# Patient Record
Sex: MALE | Race: WHITE
[De-identification: names, ages, dates, MRNs, and addresses within clinical notes are randomized per-mention and may not be internally consistent; named-entity substitution may affect disease eponyms.]

---

## 2019-10-17 NOTE — RAD
Chest AP portable at 0521:

 

Reason for examination: Altered mental status. Seizure. History of 

traumatic brain injury.

 

The heart size is normal. Mediastinum is unremarkable. Lung fields are 

clear. No acute bony abnormalities are seen.

 

Impression:

 

No acute cardiopulmonary disease.

 

 

CT head without contrast:

 

Axial images were obtained through the brain. No contrast was 

administered.

 

Exposure: One or more of the following individualized dose reduction 

techniques were utilized for this examination:  1. Automated exposure 

control  2. Adjustment of the mA and/or kV according to patient size  3. 

Use of iterative reconstruction technique.

 

Ventricular systems are symmetric and not abnormally dilated. No midline 

shift is seen. There is no evidence of intracranial hemorrhage. There is 

encephalomalacia in the left posterior temporal region and left anterior 

lateral parietal region. No acute infarct, mass or edema seen. No 

abnormalities of seen at the orbits. The paranasal sinuses and mastoid air

cells are clear. There is evidence of previous left craniotomy. No acute 

skull abnormality is seen.

 

IMPRESSION:

 

Encephalomalacia in the left temporal parietal lobes. No acute 

intracranial abnormality evident.

 

Electronically signed by: Mayi Vásquez MD (10/17/2019 6:15 AM) [facility]

## 2019-10-17 NOTE — PHYS DOC
Past History


Past Medical History:  Alcoholism, Seizure


Additional Past Medical Histor:  TBI


 (HARLEEN MCCLENDON DO)


Alcohol Use:  Occasionally


Drug Use:  None


 (HARLEEN MCCLENDON DO)





Adult General


Chief Complaint


Chief Complaint:  SEIZURE





HPI


HPI


39-year-old male presents via EMS for seizure.  The patient was reported to woke

up his significant other around 3:30 AM with tonic-clonic seizure. It is 

believed that this lasted in excess of 10 minutes until after EMS arrived and 

gave Versed.  The patient was unable to provide a history to EMS or to myself. 

This history of present illness is gleaned from EMS reports. When EMS arrived 

the patient was having rhythmic jerking of his upper and lower extremities as 

well as twitching his head. He had no loss of bowel or bladder. After he was 

given Versed, the activity appeared to stop. The patient is now very sleepy and 

is not answering questions. His significant other stated that he is short 

episodes, but nothing as long as this. Patient may or may not be on any seizure 

medication. Possible history of traumatic brain injury in the past.


 (HENRRY DALE DO)





Review of Systems


Review of Systems


Unable to assess due to the patient not answering questions.


 (HENRRY DALE DO)





Current Medications


Current Medications





Current Medications








 Medications


  (Trade)  Dose


 Ordered  Sig/Isai  Start Time


 Stop Time Status Last Admin


Dose Admin


 


 Sodium Chloride  1,000 ml @ 


 1,000 mls/hr  1X  ONCE  10/17/19 04:30


 10/17/19 05:29 UNV  











 (HENRRY DALE DO)





Physical Exam


Physical Exam





Constitutional: Well developed, well nourished, non-toxic appearance. []


HENT: Normocephalic, atraumatic, bilateral external ears normal, oropharynx 

moist, no oral exudates, nose normal. []


Eyes: PERRLA, EOMI, conjunctiva normal, no discharge. [] 


Neck: Normal range of motion, no tenderness, supple, no stridor. [] 


Cardiovascular:Heart rate regular rhythm, no murmur []


Lungs & Thorax:  Bilateral breath sounds clear to auscultation []


Abdomen: Bowel sounds normal, soft, no masses, no pulsatile masses. [] 


Skin: Warm, dry, no erythema, no rash. [] 


Back: No tenderness, no CVA tenderness. [] 


Extremities: No tenderness, no cyanosis, no clubbing, ROM intact, no edema. [] 


Neurologic: Making humming sound, moving all extremities spontaneously, no focal

 deficits noted. []


Psychologic: Unable to assess. []


 (HENRRY DALE DO)


Physical Exam


Constitutional: Well developed, well nourished, no acute distress, non-toxic 

appearance


HENT: Normocephalic, atraumatic, oropharynx moist


Eyes: PERRL, EOMI, conjunctiva normal, no discharge, no nystagmus


Neck: Normal range of motion, no tenderness, supple


Cardiovascular: Heart rate normal, regular rhythm


Lungs & Thorax:  Bilateral breath sounds clear to auscultation, no wheezing


Skin: Warm, dry, no erythema, no rash


Extremities: No tenderness, ROM intact, no edema


Neurologic: Alert and oriented X 3, expressive aphasia noted with some 

dysarthria, shakiness noted to BUE and BLE, moves all extremities, sensation 

intact


Psychologic: Affect normal, judgement normal


 (HARLEEN MCCLENDON DO)





EKG


EKG


Sinus tachycardia, rate 117, normal axis, no ST elevations or depressions.[]


 (HENRRY DALE DO)





Radiology/Procedures


Radiology/Procedures


[]


 (HENRRY DALE DO)


Radiology/Procedures


PROCEDURE: CT HEAD WO CONTRAST & CXR AP





Chest AP portable at 0521:


 


Reason for examination: Altered mental status. Seizure. History of 


traumatic brain injury.


 


The heart size is normal. Mediastinum is unremarkable. Lung fields are 


clear. No acute bony abnormalities are seen.


 


Impression:


 


No acute cardiopulmonary disease.


 


 


CT head without contrast:


 


Axial images were obtained through the brain. No contrast was 


administered.


 


Exposure: One or more of the following individualized dose reduction 


techniques were utilized for this examination:  1. Automated exposure 


control  2. Adjustment of the mA and/or kV according to patient size  3. 


Use of iterative reconstruction technique.


 


Ventricular systems are symmetric and not abnormally dilated. No midline 


shift is seen. There is no evidence of intracranial hemorrhage. There is 


encephalomalacia in the left posterior temporal region and left anterior 


lateral parietal region. No acute infarct, mass or edema seen. No 


abnormalities of seen at the orbits. The paranasal sinuses and mastoid air


cells are clear. There is evidence of previous left craniotomy. No acute 


skull abnormality is seen.


 


IMPRESSION:


 


Encephalomalacia in the left temporal parietal lobes. No acute 


intracranial abnormality evident.


 


Electronically signed by: Mayi Vásquez MD (10/17/2019 6:15 AM) [facility]


 (HARLEEN MCCLENDON DO)


Course & Med Decision Making


Course & Med Decision Making


Pertinent Labs and Imaging studies reviewed. (See chart for details)


The patient's wife has arrived and provides further history. She was awoken at 

3:30 AM with the patient having full body jerking movements. He was unable to 

communicate with her. This lasted 10 minutes or more. The wife has never seen 

the patient have a seizure like this. She believes the last one he had was 5 

years ago. 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 He 

is on Keppra and has been taking his medications. His physicians have been 

adjusting his sleep aid, anxiety medicine, and antidepressants lately. No 

changes in the last 1 week. The patient was of congestion and body aches the 

last couple of days. Patient has a history of alcohol abuse. He was in inpatient

 rehabilitation recently. His last drink was about 4 days ago. Patient has no 

history of alcohol withdrawal difficulty.





The patient's labs are unremarkable. His lactic acid is 2.5. We have given the 

patient 1 L normal saline. Urinalysis is pending. The patient has a bit more 

awake and able to follow directions. His chest x-ray is unremarkable. His head 

CT is negative for acute findings. There are significant chronic findings. See 

official report for more details. I am signing the patient out to Dr. Mcclendon at 

0600. He will determine the patient's final disposition.


[]


 (HENRRY DALE DO)


Course & Med Decision Making


0600- Sign out received from Dr. Dale for patient with history of breakthrough

 seizure.  IVF x 2L.  CT head and CXR reviewed without acute process.  Labs 

reviewed.  Lactic acid elevated likely secondary to tonic-clonic seizure.  UDS 

positive for benzodiazepines.  Hx that patient was given versed by EMS.





Patient seen and evaluated by myself. Patient continues with dysarthria and 

expressive aphasia.  Patient likely post-ictal.   Would benefit from continued 

monitoring and observation.





Patient requiring admission for further evaluation and treatment. Discussed with

 Dr. Cao (hospitalist) who is in agreement with admission. Discussed findings 

and plan with patient, who acknowledges understanding and agreement.


 (HARLEEN MCCLENDON DO)


Dragon Disclaimer


Dragon Disclaimer


This electronic medical record was generated, in whole or in part, using a voice

 recognition dictation system.


 (HENRRY DALE DO)





Departure


Departure:


Impression:  


   Primary Impression:  


   Breakthrough seizure


   Additional Impressions:  


   Lactic acidosis


   Post-ictal state


Disposition:  09 ADMITTED AS INPATIENT


Admitting Physician:  Elio Cao


 (HARLEEN MCCLENDON DO)


Condition:  STABLE





Problem Qualifiers











HENRRY DALE DO                 Oct 17, 2019 04:37


HARLEEN MCCLENDON DO             Oct 17, 2019 06:36

## 2019-10-17 NOTE — HP
ADMIT DATE:  10/17/2019



HISTORY OF PRESENT ILLNESS:  The patient is a 39-year-old  male patient

who was brought to the Emergency Room by EMS for seizure.  The patient reported

to woke up his significant other around 3:30 p.m. with tonic-clonic seizure.  It

is believed that this lasted in excess of 10 minutes until after EMS arrived and

gave Versed.  The patient was unable to provide any history to EMS or to the ER

physician.  This history of present illness was gleaned from EMS report.  When

EMS arrived, the patient was having rhythmic jerking of his upper and lower

extremities as well as twitching.  There was no reported tongue biting or loss

of bowel or bladder.  He was given Versed and activity appeared to stopped.  The

patient is very sleepy.  By the time he arrived, he was in postictal state.  His

significant other stated that most of the other episode are short but nothing as

long as this.  It transpired that the patient is on actually Keppra 1500 mg

twice a day and he has been according to him taking his medication regularly. 

However, his Xanax was stopped by his psychiatrist at [facility] and substituted with trazodone and the patient took Seroquel that was

given to him a long time ago by another doctor.  In any case, he was extensively

investigated in the Emergency Room.  His white cell count was low.  If anything,

he has mild thrombocytopenia.  His lactic acid was slightly high at 2.5. 

Initially, it came down to 0.7 and other than that most other lab works are

unremarkable.  His urinalysis was essentially unremarkable and toxic screen was

positive for any benzodiazepine.  



PAST MEDICAL HISTORY:   Significant for traumatic brain injury about 10 years

ago.  He apparently developed posttraumatic seizure disorder and alcoholism for

which he was admitted for detoxification to [facility].  



PAST SURGICAL HISTORY:  Significant for traumatic brain injury for which he

underwent craniotomy and skull flap placement, although I do not have any

specific details.  Apparently, this happened 10 years ago while he was at

[facility].



PAST PSYCHIATRIC HISTORY:  The patient reported 1 passed detoxification in 2014.

 It was primarily for opiates.  He denied advanced rehabilitation.  He denied

any suicidal attempts before.  He denied any past psychiatric hospitalization

not related to drug use and he reported that he is only prescribed Xanax and

Suboxone as psychiatric medication.



FAMILY HISTORY:  Unremarkable.



SOCIAL HISTORY:  He lives with his significant other and 2-year son.  He

apparently was a .  Unfortunately, he lost his job.  He smokes 5-10

cigarettes a day.  He drinks vodka on a daily basis, last drink according to him

was 3 weeks ago.



ALLERGIES:  He has no known drug allergies.



MEDICATIONS:  He is currently on following medications:  He is on

diphenhydramine (Benadryl) 25 mg.  He is on Keppra 1500 mg p.o. b.i.d., Tylenol

650 mg every 4 hours.  His other medications:  He is on hydroxyzine up to 50 mg

every 6 hours, trazodone 200 mg at bedtime.  He is on Prozac 20 mg once a day

for anxiety.  He is on gabapentin 600 mg 3 times a day.  He is also on Suboxone

8 mg/2 mg daily during hospital stay.



REVIEW OF SYSTEMS:  As per history of present illness.



PHYSICAL EXAMINATION:

GENERAL:  On arrival to the Emergency Room, he was very lethargic.  He

apparently was able to move all extremities spontaneously.

VITAL SIGNS:  Heart rate was 110, blood pressure was 122/69, temperature was

97.2, respiratory rate was 18 and oxygen saturation was 97% on room air.

HEAD, EYES, EARS, NOSE AND THROAT:  Showed normocephalic, atraumatic.

NECK:  Supple.

HEART:  Showed normal first and second heart sounds.  No gallop, rub or murmur.

CHEST:  Clear to auscultation.  No crepitation or rhonchi.

ABDOMEN:  Distended, soft, nontender.

NEUROLOGIC:  He was in a postictal state; however, he was able to move all

extremities spontaneously.



LABORATORY DATA:  While in the Emergency Room, he has had a CT scan of the head,

which showed that the patient's ventricular systems are symmetric and not

abnormally dilated.  No midline shift is seen.  There is no evidence of

intracranial hemorrhage.  There is encephalomalacia in the left posterior

temporal region and left anterolateral parietal region.  No acute infarct, mass

or edema seen.  No abnormalities seen at the orbits.  His paranasal sinuses and

mastoid air cells are clear.  There is evidence of previous left craniotomy.  No

acute skull abnormality seen.  His chest x-ray showed that the patient's heart

size is normal, mediastinum is unremarkable.  Lung fields are clear.  No acute

bony abnormalities are seen.  His lab work showed that his white cell count was

3900, hemoglobin 15, hematocrit 44, MCV 92, and platelet count of 133,000 with

normal manual differential.  His chemistry showed a serum sodium 143, potassium

3.6, chloride 103, bicarbonate 30, anion gap of 10, BUN 16, creatinine 1.1,

estimated GFR was 75 mL per minute.  His glucose was 98, lactic acid was 2.5,

calcium was 8.9.  Total bilirubin, AST, ALT, alkaline phosphatase were normal. 

Total protein was 6.5, albumin was 3.7.  Urinalysis showed the urine was yellow,

clear with a pH of 6, specific gravity of 1.020.  The urine was negative for

protein, glucose, trace amount of ketones.  The urine was negative for blood,

nitrite, leukocyte esterase.  There are no rbc's, no wbc's, and no bacteria. 

His toxic screen was negative for opiates, methadone, barbiturate,

phencyclidine, amphetamine, methamphetamine, cocaine, cannabinoids and alcohol. 

It was positive for benzodiazepine.



ASSESSMENT AND PLAN:  In summary, this is a 39-year-old  male patient

that came with what seemed to be breakthrough seizures.  He also was in

postictal state and had lactic acidosis that has resolved.  He has received

about 3 liters of fluid in the Emergency Room, was given a loading dose of

Keppra and 2 mg of Ativan and was admitted to continue with alcohol withdrawal

protocol and also Keppra 1500 mg twice a day.  I will consult Dr. Holliday to see

him.  We will check his creatine kinase and by the time I saw him, he has not

had any urine for almost 5 hours.  We will scan his bladder and also check his

orthostatics as he continues to feel dizzy.





______________________________

DEEPTI RENNER MD



DR:  TARAH/malia  JOB#:  [id number] / [id number]

DD:  10/17/2019 17:01  DT:  10/17/2019 17:28

## 2019-10-17 NOTE — RAD
Chest AP portable at 0521:

 

Reason for examination: Altered mental status. Seizure. History of 

traumatic brain injury.

 

The heart size is normal. Mediastinum is unremarkable. Lung fields are 

clear. No acute bony abnormalities are seen.

 

Impression:

 

No acute cardiopulmonary disease.

 

 

CT head without contrast:

 

Axial images were obtained through the brain. No contrast was 

administered.

 

Exposure: One or more of the following individualized dose reduction 

techniques were utilized for this examination:  1. Automated exposure 

control  2. Adjustment of the mA and/or kV according to patient size  3. 

Use of iterative reconstruction technique.

 

Ventricular systems are symmetric and not abnormally dilated. No midline 

shift is seen. There is no evidence of intracranial hemorrhage. There is 

encephalomalacia in the left posterior temporal region and left anterior 

lateral parietal region. No acute infarct, mass or edema seen. No 

abnormalities of seen at the orbits. The paranasal sinuses and mastoid air

cells are clear. There is evidence of previous left craniotomy. No acute 

skull abnormality is seen.

 

IMPRESSION:

 

Encephalomalacia in the left temporal parietal lobes. No acute 

intracranial abnormality evident.

 

Electronically signed by: Mayi Vásquez MD (10/17/2019 6:15 AM) [facility]

## 2019-10-18 NOTE — CONS
DATE OF CONSULTATION:  10/17/2019



REFERRING PHYSICIAN:  Dr. Cao.



REASON FOR CONSULTATION:  Possible breakthrough seizure.



HISTORY OF PRESENT ILLNESS:  This is a 39-year-old right-handed  male

who was admitted to Emergency Room after he presented with acute onset of

generalized tonic-clonic seizure began at 3:30 this morning.  The patient did

not recall the event.  He did not have any tongue biting, bowel or bladder

incontinence.  The seizure lasted approximately 10 minutes followed by postictal

confusion and disorientation.  EMS was activated and witnessed generalized

tonic-clonic seizure activities.  He was given Versed and transferred to

Emergency Room and in Emergency Room, he was found to be in postictal state. 

According to the ER note, the patient has been on Xanax, which was stopped by

his psychiatrist and substituted with trazodone.  Lactic acid was elevated at

2.5 and initial head CT scan revealed no evidence of acute intracranial process.

 Urine drug screen was unremarkable.



PAST MEDICAL HISTORY:  Significant for frequent traumatic brain injuries

probably as the etiology of his seizure; however, he was alcoholic.  History of

depression.



PAST SURGICAL HISTORY:  Significant for severe traumatic brain injury requiring

craniotomy approximately 10 years ago.



SOCIAL HISTORY:  The patient is single.  He has a 2-year-old son.  He was

working as a , but he is unemployed at this time.  He smokes half pack of

cigarettes daily and he drinks alcohol, but the last alcohol drink was 3 weeks

ago.



FAMILY HISTORY:  Noncontributory.



CURRENT MEDICATIONS:  Keppra 1500 mg b.i.d., hydroxyzine 50 mg q. 6 hours

p.r.n., trazodone 200 mg at bedtime, Prozac 20 mg daily, gabapentin 600 mg 3

times daily, Suboxone 8 mg/2 mg daily.



REVIEW OF SYSTEMS:  A 10-point review of system was performed as mentioned in

the history of present illness, otherwise, unremarkable.



PHYSICAL EXAMINATION:

GENERAL:  Well-developed, well-nourished male, not in acute distress.  He weighs

74.1 kilos.

VITAL SIGNS:  Blood pressure 119/68, respiratory rate 18, pulse is 89 and

regular, oxygen saturation is 100% on room air.

HEENT:  Normocephalic, atraumatic, otherwise, unremarkable.

NECK:  Supple.  Negative for carotid bruit, lymphadenopathy, JVD or thyromegaly.

LUNGS:  Clear to A and P.

CARDIOVASCULAR:  Regular rate and rhythm, normal S1, S2.

ABDOMEN:  Soft.  Bowel sounds positive.

EXTREMITIES:  Negative for cyanosis, clubbing or pitting edema.

NEUROLOGICAL EXAM:  Mental Status:  The patient is alert and oriented x 2.  The

speech is slow.  The patient has difficulty to complete sentences and provide

more information about his seizure.  Memory, judgment, and abstract thinking are

fair.  The patient denies hallucination or delusion.

CRANIAL NERVES:  Visual fields are full.  The pupils are reactive to light and

accommodation.  The extraocular movements are intact.  There is no nystagmus. 

There is no facial motor or sensory deficit.  Hearing is intact bilaterally. 

The palate is elevated symmetrically.  Sternocleidomastoid muscles are powerful

bilaterally.  The patient shrugs his shoulders symmetrically, protrudes his

tongue in the midline without fasciculation or atrophy.

MOTOR:  No focal muscle bulk was seen.  The tone is normal.  The strength is 4/5

throughout.  Sensory examination revealed normal pinprick, light touch,

vibratory and position senses.  Deep tendon reflexes were hypoactive with absent

Achilles responses.  Gait not tested at this time.



LABORATORY DATA:  CBC revealed white blood cells of 10.6, hemoglobin 14.3,

hematocrit 42, platelet count 124,000.  Chemistry revealed sodium of 141,

potassium 3.8, chloride 107, CO2 of 26, BUN 16, creatinine 0.9, glucose 115,

calcium is 8.  Liver enzymes are normal.  Urinalysis is negative for urinary

tract infections and urine drug screen is negative as well.



DIAGNOSTIC DATA:  Initial nonenhanced head CT scan revealed encephalomalacia to

the left temporoparietal lobe, otherwise, no acute intracranial process.  Chest

x-ray revealed no acute cardiopulmonary process.



IMPRESSION:

1.  Probably breakthrough seizure.  The patient has been on Keppra 1500 mg twice

daily for his seizure, which is probably due to traumatic brain injury which

required craniotomy.

2.  Multiple psychiatric problems as described above.



RECOMMENDATION:  Continue with current management and check Keppra level.





______________________________

M KRISSY SMITH MD



DR:  JUDY/malia  JOB#:  [id number] / [id number]

DD:  10/18/2019 10:01  DT:  10/18/2019 18:12

## 2019-10-19 NOTE — EKG
[facility] [address], [place] [address]

Test Date:    2019-10-18               Test Time:    13:30:12

Pat Name:     LEXY HOWELL          Department:   

Patient ID:   [id number]           Room:         115 A

Gender:       M                        Technician:   

:          1980               Requested By: DEEPTI RENNER

Order Number: [id number]            Reading MD:     

                                 Measurements

Intervals                              Axis          

Rate:         76                       P:            67

IL:           122                      QRS:          83

QRSD:         76                       T:            78

QT:           352                                    

QTc:          400                                    

                           Interpretive Statements

SINUS RHYTHM

COMPLEX(ES) WITH ABERRANT INTRAVENTRICULAR CONDUCTION

VENTRICULAR PREMATURE COMPLEX(ES)

ABNORMAL ECG

RI6.02

No previous ECG available for comparison

## 2019-10-19 NOTE — PN
DATE:  10/18/2019



SUBJECTIVE:  The patient is resting, slightly propped up in bed, in no apparent

respiratory distress.  He is awake, alert.  Continues somewhat forgetful, but

has been up and about, has had no further episodes of seizures.



PHYSICAL EXAMINATION:

GENERAL:  When I examined him, he looked well and was clearly in no apparent

respiratory distress.  There is no pallor, jaundice, cyanosis, or thyromegaly. 

No jugular venous distension.  No lower limb edema.

VITAL SIGNS:  Her heart rate was 71, blood pressure was 120/69, temperature was

98.6, respiratory rate 20, and oxygen saturation was 98%.

HEAD, EYES, EARS, NOSE AND THROAT:  Showed normocephalic, atraumatic.

NECK:  Supple.

HEART:  Showed normal first and second heart sounds.  No gallop or murmur.

CHEST:  Clear to auscultation.  No crepitation or rhonchi.

ABDOMEN:  Scaphoid, soft, nontender.

NEUROLOGIC:  He is definitely more awake, alert, responding appropriately.  All

cranial nerves are intact.  He moves extremities without difficulty.



His intake was 3146, output was 390.



LABORATORY DATA:  His lab work this morning showed a white cell count of 10,600,

hemoglobin 14, hematocrit 42, MCV 92, and platelet count 124,000.  Serum sodium

142, potassium 3.5, chloride 107, bicarbonate 23, anion gap of 23, BUN 12,

creatinine 0.8, estimated GFR was 180 mL per minute.  His glucose was 98,

calcium was 8.5.  CK was 577.



ASSESSMENT:

1.  Breakthrough seizures.

2.  Traumatic brain injury.

3.  Posttraumatic seizure disorder.

4.  Alcoholism.

5.  Opiates addiction.



PLAN:  Plan is to continue with IV fluid.  He has multiple ectopic beats.  We

will arrange for him to have a 12-lead EKG.  Await the Keppra level and if it is

well within therapeutic range, we will discharge him home to continue on his

1500 mg of Keppra twice a day.





______________________________

DEEPTI RENNER MD



DR:  TARAH/malia  JOB#:  [id number] / [id number]

DD:  10/18/2019 13:02  DT:  10/18/2019 23:07

## 2019-10-19 NOTE — DS
DATE OF DISCHARGE:  10/19/2019



HOSPITAL COURSE:  The patient is a 39-year-old  male patient who was

admitted with a breakthrough seizure that apparently lasted about 10 minutes

followed by postictal confusion, disorientation.  By the time the emergency

medical service personnel arrived there, he continued to have generalized

tonic-clonic seizure activity.  He was given Versed and transferred to Emergency

Room where he was found to be in postictal state.  According to ER note, the

patient has been on Xanax, which was stopped by his psychiatrist and substitute

with trazodone.  His lactic acid was elevated at 2.5.  CT scan revealed no

evidence of acute intracranial process.  Urine drug screen was remarkable.  The

patient was admitted and was given loading dose of Keppra, put back on his usual

dose, continued on his other medication that included Suboxone.  His magnesium

was low, so that was replenished and he did have mild rhabdomyolysis that has

resolved and the patient has been up and about, awake, alert, oriented to time,

place and person.  He has been moving all the extremities and ambulating without

the assistance of assistive devices.  Therefore, decision was made to discharge

him home to follow with his primary care physician.



PHYSICAL EXAMINATION:

GENERAL:  When I saw him this morning, he was sitting on the edge of the bed

comfortably, in no apparent distress.  He was pale.  No jaundice, cyanosis or

thyromegaly.  No jugular venous distension.  No limb edema.

VITAL SIGNS:  Her heart rate was 70, blood pressure was 123/72, temperature was

97.6, respiratory rate was 16, and oxygen saturation was 98%.

HEAD, EYES, EARS, NOSE AND THROAT:  Showed normocephalic, atraumatic.

NECK:  Supple.

HEART:  Showed normal first and second heart sounds.  No gallop, rub or murmur.

CHEST:  Clear to auscultation.  No crepitation or rhonchi.

ABDOMEN:  Distended, soft, nontender.  No guarding or rigidity.  No

organomegaly.  All hernial orifice intact.  Bowel sounds normal.

NEUROLOGIC:  He was grossly intact.



LABORATORY DATA:  This morning showed a serum sodium 139, potassium 3.3,

chloride 104, bicarbonate 29, anion gap of 6, BUN 9, creatinine 0.8, estimated

GFR was 180 mL per minute, his glucose 103, calcium was 8.3, magnesium was 1.9. 

His CK came down from 620 down to 430.  White cell count was 10,600, hemoglobin

14, hematocrit 42, MCV 92 and platelet count 124,000.



DISCHARGE MEDICATIONS:  He was discharged home to continue on Keppra 1500 mg

twice a day, hydroxyzine 50 mg every 6 hours, trazodone 200 mg at bedtime,

Prozac 20 mg daily, gabapentin 600 mg 3 times a day and Suboxone 8 mg/2 mg

daily.



FINAL DISCHARGE DIAGNOSES:

1.  Breakthrough seizures.

2.  Posttraumatic seizures.

3.  Traumatic brain injury.

4.  Alcoholism.

5.  History of depression.





______________________________

DEEPTI RENNER MD



DR:  TARAH/malia  JOB#:  [id number] / [id number]

DD:  10/19/2019 10:41  DT:  10/19/2019 11:33

## 2019-10-19 NOTE — EKG
[facility] [address], [place] [address]

Test Date:    2019-10-17               Test Time:    04:29:16

Pat Name:     LEXY HOWELL          Department:   

Patient ID:   [id number]           Room:         115 A

Gender:       M                        Technician:   

:          1980               Requested By: HENRRY DALE

Order Number: [id number]            Reading MD:   Sandoval Jacques MD

                                 Measurements

Intervals                              Axis          

Rate:         117                      P:            75

IN:           116                      QRS:          90

QRSD:         76                       T:            51

QT:           318                                    

QTc:          448                                    

                           Interpretive Statements

SINUS TACHYCARDIA



Electronically Signed On 10- 15:41:20 CDT by Sandoval Jacques MD

## 2021-11-30 ENCOUNTER — HOSPITAL ENCOUNTER (EMERGENCY)
Dept: HOSPITAL 63 - ER | Age: 41
LOS: 1 days | Discharge: HOME | End: 2021-12-01
Payer: SELF-PAY

## 2021-11-30 VITALS — HEIGHT: 76 IN | WEIGHT: 169.98 LBS | BODY MASS INDEX: 20.7 KG/M2

## 2021-11-30 DIAGNOSIS — S00.01XA: Primary | ICD-10-CM
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
## 2021-11-30 DIAGNOSIS — S80.212A: ICD-10-CM

## 2021-11-30 DIAGNOSIS — F10.20: ICD-10-CM

## 2021-11-30 DIAGNOSIS — Z20.822: ICD-10-CM
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
## 2021-11-30 DIAGNOSIS — R45.851: ICD-10-CM

## 2021-11-30 DIAGNOSIS — R29.6: ICD-10-CM
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
## 2021-11-30 PROCEDURE — 80329 ANALGESICS NON-OPIOID 1 OR 2: CPT

## 2021-11-30 PROCEDURE — 87426 SARSCOV CORONAVIRUS AG IA: CPT

## 2021-11-30 PROCEDURE — C9803 HOPD COVID-19 SPEC COLLECT: HCPCS

## 2021-11-30 PROCEDURE — U0003 INFECTIOUS AGENT DETECTION BY NUCLEIC ACID (DNA OR RNA); SEVERE ACUTE RESPIRATORY SYNDROME CORONAVIRUS 2 (SARS-COV-2) (CORONAVIRUS DISEASE [COVID-19]), AMPLIFIED PROBE TECHNIQUE, MAKING USE OF HIGH THROUGHPUT TECHNOLOGIES AS DESCRIBED BY CMS-2020-01-R: HCPCS

## 2021-11-30 PROCEDURE — 96365 THER/PROPH/DIAG IV INF INIT: CPT

## 2021-11-30 PROCEDURE — 80053 COMPREHEN METABOLIC PANEL: CPT

## 2021-11-30 PROCEDURE — 85025 COMPLETE CBC W/AUTO DIFF WBC: CPT

## 2021-11-30 PROCEDURE — 81001 URINALYSIS AUTO W/SCOPE: CPT

## 2021-11-30 PROCEDURE — 99285 EMERGENCY DEPT VISIT HI MDM: CPT

## 2021-11-30 PROCEDURE — 70450 CT HEAD/BRAIN W/O DYE: CPT

## 2021-11-30 PROCEDURE — G0480 DRUG TEST DEF 1-7 CLASSES: HCPCS

## 2021-11-30 PROCEDURE — 96375 TX/PRO/DX INJ NEW DRUG ADDON: CPT

## 2021-11-30 PROCEDURE — 72125 CT NECK SPINE W/O DYE: CPT

## 2021-11-30 PROCEDURE — 36415 COLL VENOUS BLD VENIPUNCTURE: CPT

## 2021-11-30 PROCEDURE — 80307 DRUG TEST PRSMV CHEM ANLYZR: CPT

## 2021-12-01 VITALS — DIASTOLIC BLOOD PRESSURE: 67 MMHG | SYSTOLIC BLOOD PRESSURE: 110 MMHG

## 2021-12-01 LAB
ACETAMIN: < 2 MCG/ML (ref 10–30)
ALBUMIN SERPL-MCNC: 4 G/DL (ref 3.4–5)
ALBUMIN/GLOB SERPL: 1.2 {RATIO} (ref 1–1.7)
ALP SERPL-CCNC: 99 U/L (ref 46–116)
ALT SERPL-CCNC: 58 U/L (ref 16–63)
AMPHETAMINE/METHAMPHETAMINE: (no result)
ANION GAP SERPL CALC-SCNC: 7 MMOL/L (ref 6–14)
APTT PPP: YELLOW S
AST SERPL-CCNC: 69 U/L (ref 15–37)
BACTERIA #/AREA URNS HPF: 0 /HPF
BARBITURATES UR-MCNC: (no result) UG/ML
BASOPHILS # BLD AUTO: 0 X10^3/UL (ref 0–0.2)
BASOPHILS NFR BLD: 1 % (ref 0–3)
BENZODIAZ UR-MCNC: (no result) UG/L
BILIRUB SERPL-MCNC: 0.2 MG/DL (ref 0.2–1)
BILIRUB UR QL STRIP: (no result)
BUN/CREAT SERPL: 19 (ref 6–20)
CA-I SERPL ISE-MCNC: 13 MG/DL (ref 8–26)
CALCIUM SERPL-MCNC: 8.2 MG/DL (ref 8.5–10.1)
CANNABINOIDS UR-MCNC: (no result) UG/L
CHLORIDE SERPL-SCNC: 106 MMOL/L (ref 98–107)
CO2 SERPL-SCNC: 30 MMOL/L (ref 21–32)
COCAINE UR-MCNC: (no result) NG/ML
CREAT SERPL-MCNC: 0.7 MG/DL (ref 0.7–1.3)
EOSINOPHIL NFR BLD: 0.2 X10^3/UL (ref 0–0.7)
EOSINOPHIL NFR BLD: 4 % (ref 0–3)
ERYTHROCYTE [DISTWIDTH] IN BLOOD BY AUTOMATED COUNT: 12.4 % (ref 11.5–14.5)
ETHANOL SERPL-MCNC: 180 MG/DL (ref 0–10)
FIBRINOGEN PPP-MCNC: CLEAR MG/DL
GFR SERPLBLD BASED ON 1.73 SQ M-ARVRAT: 125.5 ML/MIN
GLOBULIN SER-MCNC: 3.3 G/DL (ref 2.2–3.8)
GLUCOSE SERPL-MCNC: 100 MG/DL (ref 70–99)
GLUCOSE UR STRIP-MCNC: (no result) MG/DL
HCT VFR BLD CALC: 42.5 % (ref 39–53)
HGB BLD-MCNC: 14.8 G/DL (ref 13–17.5)
LYMPHOCYTES # BLD: 2.1 X10^3/UL (ref 1–4.8)
LYMPHOCYTES NFR BLD AUTO: 51 % (ref 24–48)
MCH RBC QN AUTO: 32 PG (ref 25–35)
MCHC RBC AUTO-ENTMCNC: 35 G/DL (ref 31–37)
MCV RBC AUTO: 93 FL (ref 79–100)
METHADONE SERPL-MCNC: (no result) NG/ML
MONO #: 0.3 X10^3/UL (ref 0–1.1)
MONOCYTES NFR BLD: 8 % (ref 0–9)
NEUT #: 1.5 X10^3UL (ref 1.8–7.7)
NEUTROPHILS NFR BLD AUTO: 37 % (ref 31–73)
NITRITE UR QL STRIP: (no result)
OPIATES UR-MCNC: (no result) NG/ML
PCP SERPL-MCNC: (no result) MG/DL
PLATELET # BLD AUTO: 192 X10^3/UL (ref 140–400)
POTASSIUM SERPL-SCNC: 4.1 MMOL/L (ref 3.5–5.1)
PROT SERPL-MCNC: 7.3 G/DL (ref 6.4–8.2)
RBC # BLD AUTO: 4.57 X10^6/UL (ref 4.3–5.7)
RBC #/AREA URNS HPF: 0 /HPF (ref 0–2)
SALIC: < 2.8 MG/DL (ref 2.8–20)
SODIUM SERPL-SCNC: 143 MMOL/L (ref 136–145)
SP GR UR STRIP: 1.02
SQUAMOUS #/AREA URNS LPF: (no result) /LPF
UROBILINOGEN UR-MCNC: 0.2 MG/DL
WBC # BLD AUTO: 4.2 X10^3/UL (ref 4–11)
WBC #/AREA URNS HPF: (no result) /HPF (ref 0–4)

## 2021-12-01 NOTE — PHYS DOC
Past History


Past Medical History:  Alcoholism, Seizure


Additional Past Medical Histor:  TBI


 (CON MCADAMS)


Past Surgical History:  Other


Additional Past Surgical Histo:  unk


 (CON MCADAMS)


Alcohol Use:  Occasionally


Drug Use:  None


 (CON MCADAMS)





General Adult


EDM:


Chief Complaint:  SUICIDAL IDEATION





HPI:


HPI:


Patient is a 41-year-old male who presents to the emergency department for 

suicidal ideation.  Patient reports that he drink a quarter of a liter of vodka 

today.  He has a history of alcoholism.  He reports suicidal ideation and is 

planning to kill himself with a gun.  Patient states that he does not have 

access to any weapons at home.  When the nurse asked patient's family members, 

they state that he did have a shotgun and a handgun but those were taken away 

from him.  Patient is reporting frequent falls.  He fell yesterday and today.  

Patient denies any loss of consciousness but family states that after his fall 

this afternoon he did have a loss of consciousness.  Patient does have abrasions

to his left knee and to the right side of his head.  Patient states that he has 

a history of anxiety, depression and seizures.  He takes alprazolam, Keppra and 

Lexapro.  Patient states that he has been taking his medications as prescribed. 

He has a history of grand mal seizures and his last seizure was a year ago.  

Patient denies any complaints at this time.  He is alert and oriented.  He 

states that he does not want to be seen in the emergency department.


 (CON MCADAMS)





Review of Systems:


Review of Systems:


14 body systems of the review of systems have been reviewed.  See HPI for 

pertinent positive and negative responses, otherwise all other systems are 

negative, nonpertinent or noncontributory


 (CON MCADAMS)





Allergies:


Allergies:





Allergies








Coded Allergies Type Severity Reaction Last Updated Verified


 


  No Known Drug Allergies    10/17/19 No








 (CON MCADAMS)





Physical Exam:


PE:





Constitutional: Well developed, well nourished, no acute distress, non-toxic 

appearance. []


HENT: Normocephalic, abrasion noted to right side of scalp,  bilateral external 

ears normal, oropharynx moist, no oral exudates, nose normal. []


Eyes: PERRL, EOMI, conjunctiva normal, no discharge. [] 


Neck: Normal range of motion, no bony spinal tenderness, no stepoffs or 

deformitiessupple, no stridor. [] 


Cardiovascular:Heart rate regular rhythm, no murmur []


Lungs & Thorax:  Bilateral breath sounds clear to auscultation []


Abdomen: Bowel sounds normal, soft, no tenderness, no masses, no pulsatile 

masses. [] 


Skin: Warm, dry, no erythema, no rash. [] 


Back: No tenderness, normal ROM


Extremities: No tenderness, no cyanosis, no clubbing, ROM intact, no edema. [] 


Neurologic: Alert and oriented X 3, normal motor function, normal sensory 

function, no focal deficits noted. []


Psychologic: Affect normal, judgement normal, mood normal. []


 (CON MCADAMS)





EKG:


EKG:


[]


 (CON MCADAMS)





Radiology/Procedures:


Radiology/Procedures:


[]PROCEDURE: CT HEAD AND CERVICAL SPINE WO





EXAM: CT head and cervical spine without contrast





INDICATION: Fall, loss of consciousness, suicidal ideation





COMPARISON: CT head 10/17/2019





TECHNIQUE: Axial CT imaging through the head and cervical spine without 

intravenous contrast. Sagittal and coronal reformats were obtained.


 





One or more of the following individualized dose reduction techniques were 

utilized for this examination:  





1. Automated exposure control


2. Adjustment of the mA and/or kV according to patient size


3. Use of iterative reconstruction technique.





FINDINGS:





CT head:


Ventricles and sulci are prominent. There is encephalomalacia in the left 

temporal and parietal region. No intracranial hemorrhage, acute infarct, or mass

 lesion. There are are surgical changes of left pterion craniotomy. Paranasal 

sinuses and mastoid air cells are clear. Globes and orbits are intact..





CT cervical spine:


No acute fracture. There is minimal anterolisthesis of C3 on C4. Small disc 

osteophyte complexes at C4-C5 and C5-C6. No canal or foraminal narrowing. Facet 

joints are normal. Prevertebral soft tissues normal. Mild paraseptal emphysema 

in the lung apices.





IMPRESSION: 


1. No acute intracranial abnormality. 


2. Unchanged left temporoparietal encephalomalacia.


3. No acute osseous abnormality of the cervical spine.





Electronically signed by: Felicita Colunga MD (12/1/2021 12:56 AM) [facility]














DICTATED AND SIGNED BY:     FELICITA COLUNGA MD


DATE:     12/01/21 0050





CC: HENRRY DALE DO; SARAH RIVERA MD; CON MCADAMS ~MTH0 0


 (CON MCADAMS)





Heart Score:


C/O Chest Pain:  N/A


Risk Factors:


Risk Factors:  DM, Current or recent (<one month) smoker, HTN, HLP, family 

history of CAD, obesity.


Risk Scores:


Score 0 - 3:  2.5% MACE over next 6 weeks - Discharge Home


Score 4 - 6:  20.3% MACE over next 6 weeks - Admit for Clinical Observation


Score 7 - 10:  72.7% MACE over next 6 weeks - Early Invasive Strategies


 (CON MCADAMS)





Course & Med Decision Making:


Course & Med Decision Making


Pertinent Labs and Imaging studies reviewed. (See chart for details)





[] Patient presents to the emergency department for suicidal ideation.  Patient 

has a history of alcoholism and reports drinking a quarter of a liter of vodka 

today.  Patient is alert and oriented x4.  He does have signs of trauma to his 

scalp therefore a CT scan of his head and neck were performed.  Screening lab 

work for medical clearance for psychiatric evaluation was performed in the 

emergency department.  Patient to be evaluated by member of the psychiatric 

assessment team.  Patient placed on one-to-one observation suicidal precautions 

initiated.


0056: lab work is pending at this time. Patient treated with banana bag in ER. 

PAT team at bedside. I discussed patients case with supervising physician and he

 will assume patient care at this time due to shift change. 


 (CON MCADAMS)


Course & Med Decision Making


I was the Attending physician on the above date of service of this patient. This

 patient was evaluated, examined, treated, and dispositioned from the emergency 

department by the mid-level practitioner.  Although I was working at the time , 

no assistance was requested. 





Electronically signed, Alfa Call DO


 (ALFA CALL DO)





Yen Disclaimer:


Dragon Disclaimer:


This electronic medical record was generated, in whole or in part, using a voice

 recognition dictation system.


 (CON MCADAMS)


Attending Co-Sign


The patient was seen and interviewed as well as examined at the bedside. The 

chart was reviewed. The case was discussed. Agree with the plan of care.


 (DALE,HENRRY DO)





Departure


Departure:


Impression:  


   Primary Impression:  


   Suicidal ideation


   Additional Impression:  


   Alcohol use


Disposition:  01 HOME / SELF CARE / HOMELESS


Condition:  STABLE


Referrals:  


SARAH RIVERA MD (PCP)





Additional Instructions:  


As discussed prior to ER departure, you are found to be medically fit based on 

our diagnostic work-up.  You were also evaluated by our behavioral health team 

that deemed a fit for discharge home assuming you follow prescribed safety plan.

  Please use and follow the safety plan as written to ensure continued success 

in outpatient world.  Nonetheless, if any concerning signs or symptoms or 

thoughts of self-harm or harming others arise prior to outpatient follow-up 

please do not hesitate to come back for repeat evaluation.  Is a pleasure to 

take care of you and I wish you the best going forward











CON MCADAMS           Dec 1, 2021 00:21


ALFA CALL DO                  Dec 1, 2021 09:42


HENRRY DALE DO                  Dec 1, 2021 19:02

## 2021-12-01 NOTE — RAD
EXAM: CT head and cervical spine without contrast



INDICATION: Fall, loss of consciousness, suicidal ideation



COMPARISON: CT head 10/17/2019



TECHNIQUE: Axial CT imaging through the head and cervical spine without intravenous contrast. Sagitta
l and coronal reformats were obtained.

 



One or more of the following individualized dose reduction techniques were utilized for this examinat
ion:  



1. Automated exposure control

2. Adjustment of the mA and/or kV according to patient size

3. Use of iterative reconstruction technique.



FINDINGS:



CT head:

Ventricles and sulci are prominent. There is encephalomalacia in the left temporal and parietal regio
n. No intracranial hemorrhage, acute infarct, or mass lesion. There are are surgical changes of left 
pterion craniotomy. Paranasal sinuses and mastoid air cells are clear. Globes and orbits are intact..




CT cervical spine:

No acute fracture. There is minimal anterolisthesis of C3 on C4. Small disc osteophyte complexes at C
4-C5 and C5-C6. No canal or foraminal narrowing. Facet joints are normal. Prevertebral soft tissues n
ormal. Mild paraseptal emphysema in the lung apices.



IMPRESSION: 

1. No acute intracranial abnormality. 

2. Unchanged left temporoparietal encephalomalacia.

3. No acute osseous abnormality of the cervical spine.



Electronically signed by: Felicita Colunga MD (12/1/2021 12:56 AM) [facility]